# Patient Record
Sex: FEMALE
[De-identification: names, ages, dates, MRNs, and addresses within clinical notes are randomized per-mention and may not be internally consistent; named-entity substitution may affect disease eponyms.]

---

## 2022-04-11 ENCOUNTER — NURSE TRIAGE (OUTPATIENT)
Dept: OTHER | Facility: CLINIC | Age: 27
End: 2022-04-11

## 2022-04-11 NOTE — TELEPHONE ENCOUNTER
Subjective: Caller states \"I need help finding a primary care doctor. I found a lump in my left breast. \"     Current Symptoms: lump in my left breast, denies redness     Onset: 2 days ago;     Pain Severity: 3-4/10; pain mainly when touching it     Temperature: None     What has been tried: advil     LMP:  Pregnant: No    Recommended disposition: See PCP within 3 Days - offered her the web site to find a PCP and encouraged her to go to a walk in clinic to be evaluated if she is unable to get in within the next 3 days. Care advice provided, patient verbalizes understanding; denies any other questions or concerns; instructed to call back for any new or worsening symptoms. Patient/caller agrees to follow-up with PCP     This triage is a result of a call to [address] [place] [address]. Please do not respond to the triage nurse through this encounter. Any subsequent communication should be directly with the patient. Reminders:    Be Sure to use [facility] if appropriate     Be sure to verify MMO ID, Name, and  against [employer] for ALL callers     Care Advice - both instruction and documentation    Call origin   MMO ID found in [employer]: Client MMO, be sure to document MMO ID (digits ONLY) in comment section of call origin row    MMO ID, Name/ NOT found in [employer]: Non-participant MMO Caller, leave comment section of call origin row BLANK    Closing script - If there is nothing else I can help you with? --- Please remain on the line after I disconnect to complete a brief 1 question survey. We appreciate your feedback. Thank you for allowing [facility] to partner in your care.     PLEASE DELETE ALL RED TEXT PRIOR TO SIGNING NOTE    Reason for Disposition   Breast lump    Protocols used: BREAST SYMPTOMS-ADULT-[place]

## 2024-04-02 ENCOUNTER — OFFICE VISIT (OUTPATIENT)
Dept: OBSTETRICS AND GYNECOLOGY | Facility: CLINIC | Age: 29
End: 2024-04-02
Payer: COMMERCIAL

## 2024-04-02 VITALS
DIASTOLIC BLOOD PRESSURE: 97 MMHG | SYSTOLIC BLOOD PRESSURE: 152 MMHG | HEART RATE: 87 BPM | WEIGHT: 114.4 LBS | BODY MASS INDEX: 21.05 KG/M2 | HEIGHT: 62 IN

## 2024-04-02 DIAGNOSIS — Z30.41 ENCOUNTER FOR SURVEILLANCE OF CONTRACEPTIVE PILLS: ICD-10-CM

## 2024-04-02 DIAGNOSIS — Z11.3 SCREENING FOR STDS (SEXUALLY TRANSMITTED DISEASES): ICD-10-CM

## 2024-04-02 DIAGNOSIS — N89.8 VAGINAL ODOR: ICD-10-CM

## 2024-04-02 DIAGNOSIS — Z01.419 VISIT FOR GYNECOLOGIC EXAMINATION: Primary | ICD-10-CM

## 2024-04-02 DIAGNOSIS — I10 HYPERTENSION, UNSPECIFIED TYPE: ICD-10-CM

## 2024-04-02 PROCEDURE — 88175 CYTOPATH C/V AUTO FLUID REDO: CPT | Mod: TC,GCY | Performed by: ADVANCED PRACTICE MIDWIFE

## 2024-04-02 PROCEDURE — 87205 SMEAR GRAM STAIN: CPT | Performed by: ADVANCED PRACTICE MIDWIFE

## 2024-04-02 PROCEDURE — 3080F DIAST BP >= 90 MM HG: CPT | Performed by: ADVANCED PRACTICE MIDWIFE

## 2024-04-02 PROCEDURE — 99395 PREV VISIT EST AGE 18-39: CPT | Performed by: ADVANCED PRACTICE MIDWIFE

## 2024-04-02 PROCEDURE — 87800 DETECT AGNT MULT DNA DIREC: CPT | Performed by: ADVANCED PRACTICE MIDWIFE

## 2024-04-02 PROCEDURE — 87661 TRICHOMONAS VAGINALIS AMPLIF: CPT | Mod: 59 | Performed by: ADVANCED PRACTICE MIDWIFE

## 2024-04-02 PROCEDURE — 1036F TOBACCO NON-USER: CPT | Performed by: ADVANCED PRACTICE MIDWIFE

## 2024-04-02 PROCEDURE — 3077F SYST BP >= 140 MM HG: CPT | Performed by: ADVANCED PRACTICE MIDWIFE

## 2024-04-02 RX ORDER — NORETHINDRONE ACETATE AND ETHINYL ESTRADIOL .02; 1 MG/1; MG/1
1 TABLET ORAL DAILY
COMMUNITY
Start: 2024-02-19 | End: 2024-04-02 | Stop reason: SINTOL

## 2024-04-02 ASSESSMENT — ENCOUNTER SYMPTOMS
MUSCULOSKELETAL NEGATIVE: 1
NEUROLOGICAL NEGATIVE: 1
HEMATOLOGIC/LYMPHATIC NEGATIVE: 1
EYES NEGATIVE: 1
PSYCHIATRIC NEGATIVE: 1
RESPIRATORY NEGATIVE: 1
ENDOCRINE NEGATIVE: 1
CARDIOVASCULAR NEGATIVE: 1
GASTROINTESTINAL NEGATIVE: 1
ALLERGIC/IMMUNOLOGIC NEGATIVE: 1
CONSTITUTIONAL NEGATIVE: 1

## 2024-04-02 ASSESSMENT — PAIN SCALES - GENERAL: PAINLEVEL: 0-NO PAIN

## 2024-04-02 NOTE — PROGRESS NOTES
"Subjective   Feli Danielle is a 28 y.o. female who is here for a routine exam.     Current contraception: OCP (estrogen/progesterone)  History of abnormal Pap smear: yes - ASCUS  Family history of uterine or ovarian cancer: no  Regular self breast exam: yes  History of abnormal mammogram: no  Family history of breast cancer: no  History of abnormal lipids: no  Menstrual History:  OB History          3    Para        Term                AB   3    Living             SAB   1    IAB   1    Ectopic        Multiple        Live Births                   No LMP recorded (lmp unknown).       Review of Systems   Constitutional: Negative.    HENT: Negative.     Eyes: Negative.    Respiratory: Negative.     Cardiovascular: Negative.    Gastrointestinal: Negative.    Endocrine: Negative.    Genitourinary: Negative.    Musculoskeletal: Negative.    Skin: Negative.    Allergic/Immunologic: Negative.    Neurological: Negative.    Hematological: Negative.    Psychiatric/Behavioral: Negative.         Objective   BP (!) 152/97   Pulse 87   Ht 1.575 m (5' 2\")   Wt 51.9 kg (114 lb 6.4 oz)   LMP  (LMP Unknown)   BMI 20.92 kg/m²   Physical Exam  Vitals reviewed.   Constitutional:       General: She is not in acute distress.     Appearance: Normal appearance.   HENT:      Head: Normocephalic.   Eyes:      Pupils: Pupils are equal, round, and reactive to light.   Cardiovascular:      Rate and Rhythm: Normal rate and regular rhythm.      Heart sounds: No murmur heard.     No gallop.   Pulmonary:      Effort: Pulmonary effort is normal. No respiratory distress.      Breath sounds: Normal breath sounds. No stridor. No wheezing, rhonchi or rales.   Chest:      Comments: deferred  Abdominal:      General: Abdomen is flat.      Palpations: Abdomen is soft. There is no mass.      Tenderness: There is no abdominal tenderness. There is no right CVA tenderness, left CVA tenderness or rebound.      Hernia: No hernia is present. "   Genitourinary:     General: Normal vulva.      Pubic Area: No rash.       Labia:         Right: No rash, tenderness, lesion or injury.         Left: No rash, tenderness, lesion or injury.       Urethra: No prolapse or urethral swelling.      Vagina: Normal. No foreign body. No erythema, tenderness, bleeding, lesions or prolapsed vaginal walls.      Cervix: No cervical motion tenderness, friability or lesion.      Uterus: Normal. Not enlarged, not tender and no uterine prolapse.       Adnexa: Right adnexa normal and left adnexa normal.        Right: No mass or tenderness.          Left: No mass or tenderness.        Rectum: Normal.      Comments: EGBUS WNL   Musculoskeletal:      Cervical back: Neck supple. No rigidity or tenderness.   Skin:     General: Skin is warm and dry.   Neurological:      Mental Status: She is alert.   Psychiatric:         Mood and Affect: Mood normal.         Behavior: Behavior normal.         Thought Content: Thought content normal.         Judgment: Judgment normal.          Assessment/Plan   Problem List Items Addressed This Visit    None  Visit Diagnoses       Visit for gynecologic examination    -  Primary    Relevant Orders    CBC    THINPREP PAP TEST    Hemoglobin A1c    Hepatitis C Antibody    HIV-1 and HIV-2 antibodies    Syphilis Screen with Reflex    TSH with reflex to Free T4 if abnormal    Vaginal odor        Relevant Orders    Vaginitis Gram Stain For Bacterial Vaginosis + Yeast    Encounter for surveillance of contraceptive pills        Relevant Medications    drospirenone, contraceptive, 4 mg (28) tablet    Screening for STDs (sexually transmitted diseases)        Relevant Orders    THINPREP PAP TEST    Hepatitis C Antibody    HIV-1 and HIV-2 antibodies    Syphilis Screen with Reflex    Hepatitis B surface Ag    Hypertension, unspecified type        Relevant Orders    Referral to Primary Care             All questions answered.  Await pap smear results.  Blood tests: CBC  with diff, TSH, and STI panel.  Breast self exam technique reviewed and patient encouraged to perform self-exam monthly.  Discussed healthy lifestyle modifications.  Reviewed MADIE and HTN patient ok with switching to slynd .  Reviewed shilpa of BV and how to promote healthy vaginal microbiome     Return 1yr or sooner  Nalini LEDEZMA CNM

## 2024-04-03 LAB
CLUE CELLS VAG LPF-#/AREA: NORMAL /[LPF]
NUGENT SCORE: 3
YEAST VAG WET PREP-#/AREA: NORMAL

## 2024-04-04 LAB
C TRACH RRNA SPEC QL NAA+PROBE: NEGATIVE
N GONORRHOEA DNA SPEC QL PROBE+SIG AMP: NEGATIVE
T VAGINALIS RRNA SPEC QL NAA+PROBE: NEGATIVE

## 2024-04-11 LAB
CYTOLOGY CMNT CVX/VAG CYTO-IMP: NORMAL
LAB AP CONTRACEPTIVE HISTORY: NORMAL
LAB AP HPV GENOTYPE QUESTION: YES
LAB AP HPV HR: NORMAL
LAB AP PAP ADDITIONAL TESTS: NORMAL
LAB AP PREVIOUS ABNORMAL HISTORY: NORMAL
LABORATORY COMMENT REPORT: NORMAL
PATH REPORT.TOTAL CANCER: NORMAL

## 2024-04-29 ENCOUNTER — OFFICE VISIT (OUTPATIENT)
Dept: PRIMARY CARE | Facility: CLINIC | Age: 29
End: 2024-04-29
Payer: COMMERCIAL

## 2024-04-29 VITALS
HEART RATE: 93 BPM | DIASTOLIC BLOOD PRESSURE: 90 MMHG | BODY MASS INDEX: 20.98 KG/M2 | HEIGHT: 62 IN | WEIGHT: 114 LBS | OXYGEN SATURATION: 98 % | RESPIRATION RATE: 14 BRPM | SYSTOLIC BLOOD PRESSURE: 135 MMHG

## 2024-04-29 DIAGNOSIS — E04.9 GOITER: ICD-10-CM

## 2024-04-29 DIAGNOSIS — F51.01 PRIMARY INSOMNIA: ICD-10-CM

## 2024-04-29 DIAGNOSIS — Z00.00 ANNUAL PHYSICAL EXAM: Primary | ICD-10-CM

## 2024-04-29 DIAGNOSIS — M62.830 LUMBAR PARASPINAL MUSCLE SPASM: ICD-10-CM

## 2024-04-29 DIAGNOSIS — I10 HYPERTENSION, UNSPECIFIED TYPE: ICD-10-CM

## 2024-04-29 DIAGNOSIS — R53.83 OTHER FATIGUE: ICD-10-CM

## 2024-04-29 PROBLEM — G89.29 CHRONIC MIDLINE LOW BACK PAIN WITHOUT SCIATICA: Status: ACTIVE | Noted: 2024-04-29

## 2024-04-29 PROBLEM — O21.9 NAUSEA AND VOMITING DURING PREGNANCY (HHS-HCC): Status: ACTIVE | Noted: 2024-04-29

## 2024-04-29 PROBLEM — R87.620 VAGINAL PAP SMEAR WITH ASC-US: Status: ACTIVE | Noted: 2024-04-29

## 2024-04-29 PROBLEM — J02.9 PHARYNGITIS, ACUTE: Status: ACTIVE | Noted: 2024-04-29

## 2024-04-29 PROBLEM — M54.50 CHRONIC MIDLINE LOW BACK PAIN WITHOUT SCIATICA: Status: ACTIVE | Noted: 2024-04-29

## 2024-04-29 PROBLEM — N93.9 ABNORMAL UTERINE BLEEDING (AUB): Status: ACTIVE | Noted: 2024-04-29

## 2024-04-29 PROBLEM — N76.0 BACTERIAL VAGINOSIS: Status: ACTIVE | Noted: 2024-04-29

## 2024-04-29 PROBLEM — N89.8 VAGINAL DISCHARGE: Status: ACTIVE | Noted: 2024-04-29

## 2024-04-29 PROBLEM — N92.6 IRREGULAR PERIODS: Status: ACTIVE | Noted: 2024-04-29

## 2024-04-29 PROBLEM — N94.9 VAGINAL DISCOMFORT: Status: ACTIVE | Noted: 2024-04-29

## 2024-04-29 PROBLEM — A64 STI (SEXUALLY TRANSMITTED INFECTION): Status: ACTIVE | Noted: 2024-04-29

## 2024-04-29 PROBLEM — B96.89 BACTERIAL VAGINOSIS: Status: ACTIVE | Noted: 2024-04-29

## 2024-04-29 PROBLEM — N83.202 CYST OF LEFT OVARY: Status: ACTIVE | Noted: 2024-04-29

## 2024-04-29 PROBLEM — K21.9 GERD (GASTROESOPHAGEAL REFLUX DISEASE): Status: ACTIVE | Noted: 2024-04-29

## 2024-04-29 PROBLEM — N76.0 RECURRENT VAGINITIS: Status: ACTIVE | Noted: 2024-04-29

## 2024-04-29 PROBLEM — N63.20 LEFT BREAST MASS: Status: ACTIVE | Noted: 2024-04-29

## 2024-04-29 PROBLEM — N92.6 MISSED MENSES: Status: ACTIVE | Noted: 2024-04-29

## 2024-04-29 PROCEDURE — 99203 OFFICE O/P NEW LOW 30 MIN: CPT | Performed by: INTERNAL MEDICINE

## 2024-04-29 PROCEDURE — 3080F DIAST BP >= 90 MM HG: CPT | Performed by: INTERNAL MEDICINE

## 2024-04-29 PROCEDURE — 3075F SYST BP GE 130 - 139MM HG: CPT | Performed by: INTERNAL MEDICINE

## 2024-04-29 PROCEDURE — 1036F TOBACCO NON-USER: CPT | Performed by: INTERNAL MEDICINE

## 2024-04-29 PROCEDURE — 99385 PREV VISIT NEW AGE 18-39: CPT | Performed by: INTERNAL MEDICINE

## 2024-04-29 RX ORDER — HYDROCHLOROTHIAZIDE 12.5 MG/1
12.5 TABLET ORAL DAILY
Qty: 30 TABLET | Refills: 0 | Status: SHIPPED | OUTPATIENT
Start: 2024-04-29 | End: 2024-05-29

## 2024-04-29 ASSESSMENT — ENCOUNTER SYMPTOMS
MUSCULOSKELETAL NEGATIVE: 1
PSYCHIATRIC NEGATIVE: 1
ALLERGIC/IMMUNOLOGIC NEGATIVE: 1
ENDOCRINE NEGATIVE: 1
CONSTITUTIONAL NEGATIVE: 1
HEMATOLOGIC/LYMPHATIC NEGATIVE: 1
EYES NEGATIVE: 1
CARDIOVASCULAR NEGATIVE: 1
GASTROINTESTINAL NEGATIVE: 1
RESPIRATORY NEGATIVE: 1
NEUROLOGICAL NEGATIVE: 1

## 2024-04-29 NOTE — ASSESSMENT & PLAN NOTE
No recent hospitalizations.    All medications reviewed and reconciled by me the provider..  No use of controlled substances or opiates.    Family history, social history reviewed, no changes.    Patient does not smoke.    Patient does not drink.    Patient hydrates adequately daily.  Eats a well-balanced healthy diet.     Exercises adequately including walking and doing weightbearing exercises.    Patient denies any difficulty with memory or cognition.     Denies any difficulty with hearing.  Patient does not wear hearing aids.    No fall risk.  No recent falls.  Denies any difficulty walking.    Patient with no history of depression anxiety, denies any loss of interest, no feeling of sadness, no lack of motivation.

## 2024-04-29 NOTE — ASSESSMENT & PLAN NOTE
HTN - hypertension well/controlled .Target BP < 130/80  achieved. Educate low salt diet and exercise with weight loss. Educate home self monitoring and diary keeping. Educate risks of elevate blood pressure and benefits of prompt treatment.  Start hydrochlorothiazide 12.5 mg daily

## 2024-04-29 NOTE — ASSESSMENT & PLAN NOTE
DDD - Degenerative disc disease of the Lumbo-Sacral (LS)/ spine. Educate exercises and referred patient to Physical Therapy (PT). Ordered X-Ray's of the LS/spine. Consider MRI. Radiculopathy in the distribution of L4-L5-S1/ nerve roots, needs NSAIDS (Naprosin 500mg BID vs. Arthrotec 75mg BID or Prednisone taper (Medrol dose pack), Flexeril 10 mg qHS, heat application, and pain control with Tylenol

## 2024-04-29 NOTE — ASSESSMENT & PLAN NOTE
Insomnia - Educate sleep hygiene, avoid naps. Avoid excessive coffee or chocolate. Consider relaxation techniques. Start initially Melatonin and Tylenol 500 mg at bed time.

## 2024-04-29 NOTE — PROGRESS NOTES
"Subjective   Patient ID: Feli Danielle is a 28 y.o. female who presents for New Patient Visit (Npv/Ob referred to come because of BP).    HPI     Review of Systems   Constitutional: Negative.    HENT: Negative.     Eyes: Negative.    Respiratory: Negative.     Cardiovascular: Negative.    Gastrointestinal: Negative.    Endocrine: Negative.    Musculoskeletal: Negative.    Skin: Negative.    Allergic/Immunologic: Negative.    Neurological: Negative.    Hematological: Negative.    Psychiatric/Behavioral: Negative.     All other systems reviewed and are negative.      Objective   /82 (BP Location: Right arm, Patient Position: Sitting)   Pulse 93   Resp 14   Ht 1.575 m (5' 2\")   Wt 51.7 kg (114 lb)   LMP  (LMP Unknown)   SpO2 98%   BMI 20.85 kg/m²   Blood pressure 135/90, pulse 93, resp. rate 14, height 1.575 m (5' 2\"), weight 51.7 kg (114 lb), SpO2 98%.   Physical Exam  Vitals and nursing note reviewed.   Constitutional:       Appearance: Normal appearance.   HENT:      Head: Normocephalic and atraumatic.      Right Ear: Tympanic membrane, ear canal and external ear normal.      Left Ear: Tympanic membrane, ear canal and external ear normal. There is no impacted cerumen.      Nose: Nose normal.      Mouth/Throat:      Mouth: Mucous membranes are moist.      Pharynx: Oropharynx is clear.   Eyes:      Extraocular Movements: Extraocular movements intact.      Conjunctiva/sclera: Conjunctivae normal.      Pupils: Pupils are equal, round, and reactive to light.   Cardiovascular:      Rate and Rhythm: Normal rate and regular rhythm.      Pulses: Normal pulses.      Heart sounds: Normal heart sounds. No murmur heard.  Pulmonary:      Effort: Pulmonary effort is normal. No respiratory distress.      Breath sounds: Normal breath sounds. No stridor. No wheezing, rhonchi or rales.   Chest:      Chest wall: No tenderness.   Abdominal:      General: Abdomen is flat. Bowel sounds are normal. There is no distension.    "   Palpations: Abdomen is soft. There is no mass.      Tenderness: There is no abdominal tenderness. There is no right CVA tenderness, left CVA tenderness, guarding or rebound.      Hernia: No hernia is present.   Musculoskeletal:         General: Normal range of motion.      Cervical back: Normal range of motion and neck supple.   Skin:     General: Skin is warm.      Capillary Refill: Capillary refill takes less than 2 seconds.   Neurological:      General: No focal deficit present.      Mental Status: She is alert.      Cranial Nerves: No cranial nerve deficit.      Sensory: No sensory deficit.      Motor: No weakness.      Coordination: Coordination normal.      Gait: Gait normal.      Deep Tendon Reflexes: Reflexes normal.   Psychiatric:         Mood and Affect: Mood normal.         Behavior: Behavior normal. Behavior is cooperative.         Thought Content: Thought content normal.         Judgment: Judgment normal.         Assessment/Plan   Problem List Items Addressed This Visit             ICD-10-CM    Annual physical exam - Primary Z00.00     No recent hospitalizations.    All medications reviewed and reconciled by me the provider..  No use of controlled substances or opiates.    Family history, social history reviewed, no changes.    Patient does not smoke.    Patient does not drink.    Patient hydrates adequately daily.  Eats a well-balanced healthy diet.     Exercises adequately including walking and doing weightbearing exercises.    Patient denies any difficulty with memory or cognition.     Denies any difficulty with hearing.  Patient does not wear hearing aids.    No fall risk.  No recent falls.  Denies any difficulty walking.    Patient with no history of depression anxiety, denies any loss of interest, no feeling of sadness, no lack of motivation.           Hypertension I10     HTN - hypertension well/controlled .Target BP < 130/80  achieved. Educate low salt diet and exercise with weight loss.  Educate home self monitoring and diary keeping. Educate risks of elevate blood pressure and benefits of prompt treatment.  Start hydrochlorothiazide 12.5 mg daily          Primary insomnia F51.01     Insomnia - Educate sleep hygiene, avoid naps. Avoid excessive coffee or chocolate. Consider relaxation techniques. Start initially Melatonin and Tylenol 500 mg at bed time.

## 2024-05-17 ENCOUNTER — APPOINTMENT (OUTPATIENT)
Dept: RADIOLOGY | Facility: HOSPITAL | Age: 29
End: 2024-05-17
Payer: COMMERCIAL

## 2024-05-24 ENCOUNTER — HOSPITAL ENCOUNTER (OUTPATIENT)
Dept: RADIOLOGY | Facility: HOSPITAL | Age: 29
Discharge: HOME | End: 2024-05-24
Payer: COMMERCIAL

## 2024-05-24 DIAGNOSIS — E04.9 GOITER: ICD-10-CM

## 2024-05-24 PROCEDURE — 76536 US EXAM OF HEAD AND NECK: CPT | Performed by: RADIOLOGY

## 2024-05-24 PROCEDURE — 76536 US EXAM OF HEAD AND NECK: CPT

## 2024-06-07 ENCOUNTER — APPOINTMENT (OUTPATIENT)
Dept: RADIOLOGY | Facility: CLINIC | Age: 29
End: 2024-06-07
Payer: COMMERCIAL

## 2024-06-10 ENCOUNTER — APPOINTMENT (OUTPATIENT)
Dept: PRIMARY CARE | Facility: CLINIC | Age: 29
End: 2024-06-10
Payer: COMMERCIAL

## 2024-06-18 ENCOUNTER — NURSE ONLY (OUTPATIENT)
Dept: OBSTETRICS AND GYNECOLOGY | Facility: CLINIC | Age: 29
End: 2024-06-18
Payer: COMMERCIAL

## 2024-06-18 NOTE — PROGRESS NOTES
Hypertension, unspecified type          Relevant Orders     Referral to Primary Care                All questions answered.  Await pap smear results.  Blood tests: CBC with diff, TSH, and STI panel.  Breast self exam technique reviewed and patient encouraged to perform self-exam monthly.  Discussed healthy lifestyle modifications.  Reviewed MADIE and HTN patient ok with switching to slynd .  Reviewed shilpa of BV and how to promote healthy vaginal microbiome      Return 1yr or sooner  Nalini LEDEZMA CNM    Note from 4/2/2024 visit  Needed for PA of Slynd prescription  Original visit exceeding insurance 20 page maximum

## 2024-06-20 ENCOUNTER — NURSE ONLY (OUTPATIENT)
Dept: OBSTETRICS AND GYNECOLOGY | Facility: CLINIC | Age: 29
End: 2024-06-20
Payer: COMMERCIAL

## 2024-06-24 ENCOUNTER — APPOINTMENT (OUTPATIENT)
Dept: PRIMARY CARE | Facility: CLINIC | Age: 29
End: 2024-06-24
Payer: COMMERCIAL